# Patient Record
(demographics unavailable — no encounter records)

---

## 2025-02-14 NOTE — REVIEW OF SYSTEMS
[Blurry Vision] : no blurred vision [Hearing Loss] : no hearing loss [Tinnitus] : no tinnitus [Lower Ext Edema] : no extremity edema [Leg Claudication] : no intermittent leg claudication [Dizziness] : dizziness [Anxiety] : no anxiety [Negative] : Gastrointestinal [FreeTextEntry5] : see HPI

## 2025-02-14 NOTE — ASSESSMENT
[FreeTextEntry1] : 47 YOF  HLD Persistent dizziness. Episodes of palpitations and syncope. Chest pain.  Plan: 2D ECHO MCOT for 4 weeks. Carotid duplex Follow up after the tests.  Samuel Mccarthy MD

## 2025-02-14 NOTE — HISTORY OF PRESENT ILLNESS
[FreeTextEntry1] : 47 year old female with a PMHx of with a PMHx of HLD, GERD  Pt presents for a cardiac evaluation. She has had 4 syncopal episodes at night after palpitations and chest burning woke her from her sleep and she quickly tried to get up from bed. She has been having dizziness for the past year, followed by a neurologist, was on migraine medications and did PT for upper spine misalignment. Migraines improved but still having dizziness describes as feels like she is on a boat. She gets midsternal chest pressure and SOB when she is anxious.   CCTA 2022: Patent coronary arteries without evidence of plaque or stenosis. The total Agatston coronary artery calcium score equals 0. CAD-RADS 0.  
The patient is a 52y Male complaining of abdominal pain.

## 2025-03-21 NOTE — HISTORY OF PRESENT ILLNESS
[FreeTextEntry1] : 47 year old female with a PMHx of with a PMHx of HLD, GERD  She has had 4 syncopal episodes at night after palpitations and chest burning woke her from her sleep and she quickly tried to get up from bed. She has been having dizziness for the past year, followed by a neurologist, was on migraine medications and did PT for upper spine misalignment. Migraines improved but still having dizziness describes as feels like she is on a boat. She gets midsternal chest pressure and SOB when she is anxious.   Pt presents for a follow up. She is c/o BP ranging from low SBP 90s to elevated up to 140s. No syncope since last visit but still frequent lightheadedness. Still gets episodes chest pain especially when she is stressed. Still having frequent palpitations-heart racing, HR goes up to 110, no precipitating factors.   2D ECHO 3/2025: EF 64% MCOT: NSR episodes of sinus tachycardia

## 2025-03-21 NOTE — CARDIOLOGY SUMMARY
[de-identified] : 3/21/25: SR 88bpm with PACs. [de-identified] : CCTA 2022: Patent coronary arteries without evidence of plaque or stenosis. The total Agatston coronary artery calcium score equals 0. CAD-RADS 0.

## 2025-03-21 NOTE — ASSESSMENT
[FreeTextEntry1] : 47 YOF  HLD Persistent dizziness. Episodes of palpitations and syncope. No evidence of cardiac arrhythmia (SR with sinus tachycardia). Normal LVEF.  Plan: Patient advised to increase fluid intake, avoid dehydration.  Lifestyle changes (sleep, exercise discussed). Patient will discuss treatment of anxiety with PCP. No additional cardiac evaluation at this time.  Samuel Mccarthy MD

## 2025-03-21 NOTE — REVIEW OF SYSTEMS
[Dizziness] : dizziness [Negative] : Gastrointestinal [Blurry Vision] : no blurred vision [Hearing Loss] : no hearing loss [Tinnitus] : no tinnitus [Lower Ext Edema] : no extremity edema [Leg Claudication] : no intermittent leg claudication [Anxiety] : no anxiety [FreeTextEntry5] : see HPI

## 2025-04-11 NOTE — HISTORY OF PRESENT ILLNESS
[FreeTextEntry1] : Since last telephone consultation, she does not feel that the Qulipta is helping her headache anymore. She uses more Tylenol and other OTC NSAID more often for the headache. She uses Qulipta no more than once a month. Prior poor tolerance to topiramate and gabapentin. She tried amitriptyline and did not see benefit. She stopped the medication.  She feels that her pain is more frequent in the occipital region. She also developed more frequent intermittent dizziness after chiropractic manipulation. 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 She consulted Dr. Flo Ulrich in [facility]. He recommended stopped chiropractic manipulation. She has pending second opinion by [facility] vascular surgeon. At this point, she has constant dizziness, worse when she moves her head sideway. Previously, she failed vestibular therapy.  She is still troubled by insomnia. She takes clonazepam when she wakes up at 3 or 4am. She consulted psychiatry and was prescribed Paxil. She has not started the medication.   She used to have tinnitus. However, she no longer has tinnitus now.  Background history:  She developed intermittent headaches since age 32. During early childhood, she had sporadic headaches.  Location: occipital with radiation to frontal region. Lately, starts eye pain since 12/2023. Type of pain: pressure pain. Trigger: excessive exposure to light and loud noise, stress. Associated symptoms: nausea, dizziness, pressure sensation inside ears. Duration: whole day. Frequency: daily since 12/2023. She had ER visit in 3/2024. Blood work was done, which was unremarkable during ER visit. Other studies: brain MRI on 12/28/23, reported unremarkable. Images reviewed. Treatments: since 12/2023, lack of benefit: OTC NSAIDs, sumatriptan, Ubrelvy. Also tried Aimovig, not sure if it worked. Propranolol helped BP and some degree of headache. However, daily medication resulted in hypotension.  She had 3 episodes of LOC in 2024. She felt nausea and quickly passed out. She had LOC for about a couple of minutes. When she regained consciousness, she had alternating cold and sweat for up to a couple of hours. She is not sure if she had a severe headache at that time.  Chronic insomnia. She takes Doxepin and not able to stop that. She had no history of head injury.  She had comprehensive blood work, VF measurement since worsening of headache. All were unremarkable.

## 2025-04-11 NOTE — PHYSICAL EXAM
[FreeTextEntry1] : Constitutional: alert and in no acute distress.  Psychiatric: oriented to person, place, and time, insight and judgment were intact and the affect was normal.  Neurologic:   Orientation: oriented to person, oriented to place and oriented to time.   Attention: normal concentrating ability and visual attention was not decreased.   Language: no difficulty naming common objects, no difficulty repeating a phrase, no difficulty writing a sentence, fluency intact, comprehension intact and reading intact.   Fund of knowledge: displays adequate knowledge of personal past history.   Cranial Nerves: visual acuity intact bilaterally, visual fields full to confrontation, pupils equal round and reactive to light, extraocular motion intact, facial sensation intact symmetrically, face symmetrical, hearing was intact bilaterally, tongue and palate midline, head turning and shoulder shrug symmetric and there was no tongue deviation with protrusion.   Motor: muscle tone was normal in all four extremities, muscle strength was normal in all four extremities and normal bulk in all four extremities.   Motor Strength:. the patient is left hand dominant.   Sensory exam: light touch was intact . Romberg's sign was negtive.   Coordination:. normal gait. balance was intact. balance was intact. there was no past-pointing. no tremor present.   Deep tendon reflexes: Biceps right 2+. Biceps left 2+.  Triceps right 2+. Triceps left 2+.  Brachioradialis right 2+. Brachioradialis left 2+.  Patella right 2+. Patella left 2+.  Ankle jerk right 2+. Ankle jerk left 2+.   Plantar responses normal on the right, normal on the left.   Ankle Clonus absent on the right, absent on the left.    Superficial/Primitive Reflexes: primitive reflexes were absent and the glabellar reflex was absent.  Neck: lateral neck muscles are tendered and mildly rigid.  Vascular:. there was no peripheral edema.  Back: no spinal tenderness.  Musculoskeletal: no involuntary movements were seen.

## 2025-04-11 NOTE — DISCUSSION/SUMMARY
[FreeTextEntry1] : She has more features of tension headaches at this point. Due to neck muscle stiffness and her dizziness is worse with head movement, will try low dosage of tizanidine nightly. Hope to help her insomnia as well. Agree with consult with vascular surgeon regarding absent left VA, doubt intervention needed.

## 2025-07-23 NOTE — HISTORY OF PRESENT ILLNESS
[FreeTextEntry1] : Since last visit, she still has multiple symptoms.  She is most troubled by insomnia. She tried tizanidine with poor tolerance. She tried some other medications in the past. Only medication helping her is clonazepam. She takes low dosage nightly. At times, she adds Tylenol PM. She tried amitriptyline for two nights, which did not help. She might try again down the road. She consulted psychiatry and was prescribed Paxil. She takes 5mg qod. Higher dosage is poorly tolerated. She has pending follow up by psychiatrist, though running out of supply of clonazepam.  Tension headache about 2-3/week. Strong migraine about weekly. Qulipta was helping her headache anymore. She uses more Tylenol and other OTC NSAID for the headache. Prior poor tolerance to topiramate and gabapentin. She stopped propranolol due to holistic treatment controlled her BP. The medication did not help her headache though.  Another brain MRI was done ordered by Dr. Ulrich. Reported enlarged pineal region. Less dizziness since last visit.  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 She consulted Dr. Flo Ulrich in [facility]. He recommended stopped chiropractic manipulation. She has pending second opinion by [facility] vascular surgeon, which was postponed. Previously, she failed vestibular therapy.  She used to have tinnitus. However, she no longer has tinnitus now.  Background history:  She developed intermittent headaches since age 32. During early childhood, she had sporadic headaches.  Location: occipital with radiation to frontal region. Lately, starts eye pain since 12/2023. Type of pain: pressure pain. Trigger: excessive exposure to light and loud noise, stress. Associated symptoms: nausea, dizziness, pressure sensation inside ears. Duration: whole day. Frequency: daily since 12/2023. She had ER visit in 3/2024. Blood work was done, which was unremarkable during ER visit. Other studies: brain MRI on 12/28/23, reported unremarkable. Images reviewed. Treatments: since 12/2023, lack of benefit: OTC NSAIDs, sumatriptan, Ubrelvy. Also tried Aimovig, not sure if it worked. Propranolol helped BP and some degree of headache. However, daily medication resulted in hypotension.  She had 3 episodes of LOC in 2024. She felt nausea and quickly passed out. She had LOC for about a couple of minutes. When she regained consciousness, she had alternating cold and sweat for up to a couple of hours. She is not sure if she had a severe headache at that time.  Chronic insomnia. She takes Doxepin and not able to stop that. She had no history of head injury.  She had comprehensive blood work, VF measurement since worsening of headache. All were unremarkable.

## 2025-07-23 NOTE — PHYSICAL EXAM
[FreeTextEntry1] : Constitutional: alert and in no acute distress.  Psychiatric: oriented to person, place, and time, insight and judgment were intact and the affect was normal.  Neurologic: Orientation: oriented to person, oriented to place and oriented to time. Attention: normal concentrating ability and visual attention was not decreased. Language: no difficulty naming common objects, no difficulty repeating a phrase, no difficulty writing a sentence, fluency intact, comprehension intact and reading intact. Fund of knowledge: displays adequate knowledge of personal past history. Cranial Nerves: visual acuity intact bilaterally, visual fields full to confrontation, pupils equal round and reactive to light, extraocular motion intact, facial sensation intact symmetrically, face symmetrical, hearing was intact bilaterally, tongue and palate midline, head turning and shoulder shrug symmetric and there was no tongue deviation with protrusion. Motor: muscle tone was normal in all four extremities, muscle strength was normal in all four extremities and normal bulk in all four extremities. Motor Strength:. the patient is left hand dominant. Sensory exam: light touch was intact. Romberg's sign was negtive. Coordination:. normal gait. balance was intact. balance was intact. there was no past-pointing. no tremor present. Deep tendon reflexes: Biceps right 2+. Biceps left 2+. Triceps right 2+. Triceps left 2+. Brachioradialis right 2+. Brachioradialis left 2+. Patella right 2+. Patella left 2+. Ankle jerk right 2+. Ankle jerk left 2+. Plantar responses normal on the right, normal on the left. Ankle Clonus absent on the right, absent on the left. Superficial/Primitive Reflexes: primitive reflexes were absent and the glabellar reflex was absent.  Neck: lateral neck muscles are tendered and mildly rigid.  Vascular:. there was no peripheral edema.  Back: no spinal tenderness.  Musculoskeletal: no involuntary movements were seen.

## 2025-07-23 NOTE — DISCUSSION/SUMMARY
[FreeTextEntry1] : Will help her with one month supply of clonazepam. Advise her to consult the stroke specialist for more opinion regarding the lack of left VA. She has pending appointment. At this point, she is not ready to try other medications for headache and stiff neck and would like to focus on more holistic approach. 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 Agree to repeat in 1 year or earlier if needed.